# Patient Record
Sex: OTHER/UNKNOWN | URBAN - METROPOLITAN AREA
[De-identification: names, ages, dates, MRNs, and addresses within clinical notes are randomized per-mention and may not be internally consistent; named-entity substitution may affect disease eponyms.]

---

## 2022-06-14 ENCOUNTER — TELEPHONE (OUTPATIENT)
Dept: UROLOGY | Facility: MEDICAL CENTER | Age: 27
End: 2022-06-14

## 2022-06-14 NOTE — TELEPHONE ENCOUNTER
[facility] Ed visit 6/14/22  ED Course as of 06/14/22 0339   Tue Jun 14, 2022   0047 No urination since 3 am, last bm this am  Taking laxatives  Has been here several times for same complaint with only result of stool in the colon  [JS]   1022 Pt monitored in ED for 10h  Pt stating she cannot urinate  Bladder scan 177 in [address] [place] [address] and 206 in ED  Straight cathed for 250cc urine  Ua neg  Previous w/u in ED have been wnl  Will defer further testing at this time and refer to urology  Patient states they she has 3 bottles of water  Voiding  Advised patient they we will call her back with an appt  Due to scheduling availability

## 2022-06-14 NOTE — TELEPHONE ENCOUNTER
Pt called the office stated that she went ER 3 times at [facility] for Urinary Retention, nausea, pressure and pain above her bladder  No hx of kidney stone      Pt can be reached at [phone]

## 2022-06-14 NOTE — TELEPHONE ENCOUNTER
Returned call to patient   Advised per Provider with our practice upon review of records from Ed  Was advised to contact her pcp for evaluation for hypothyroidism  We can see her non-urgently in our office for next available new patient  appt   Appt offered in our [facility] location for July 5   Patient states she will contact her pcp and if they advised her to contact urology she will call back and schedule an appt